# Patient Record
Sex: FEMALE | Race: WHITE | NOT HISPANIC OR LATINO | Employment: UNEMPLOYED | URBAN - METROPOLITAN AREA
[De-identification: names, ages, dates, MRNs, and addresses within clinical notes are randomized per-mention and may not be internally consistent; named-entity substitution may affect disease eponyms.]

---

## 2023-01-20 ENCOUNTER — PATIENT MESSAGE (OUTPATIENT)
Dept: OTHER | Facility: OTHER | Age: 28
End: 2023-01-20

## 2023-05-05 DIAGNOSIS — Z00.00 ROUTINE ADULT HEALTH MAINTENANCE: Primary | ICD-10-CM

## 2023-05-05 DIAGNOSIS — Z76.89 ENCOUNTER TO ESTABLISH CARE WITH NEW DOCTOR: ICD-10-CM

## 2023-05-08 ENCOUNTER — OFFICE VISIT (OUTPATIENT)
Dept: CARDIOLOGY | Facility: CLINIC | Age: 28
End: 2023-05-08
Payer: COMMERCIAL

## 2023-05-08 ENCOUNTER — HOSPITAL ENCOUNTER (OUTPATIENT)
Dept: CARDIOLOGY | Facility: HOSPITAL | Age: 28
Discharge: HOME | End: 2023-05-08
Attending: INTERNAL MEDICINE
Payer: COMMERCIAL

## 2023-05-08 VITALS
WEIGHT: 145 LBS | HEART RATE: 127 BPM | DIASTOLIC BLOOD PRESSURE: 70 MMHG | OXYGEN SATURATION: 99 % | WEIGHT: 144.38 LBS | HEIGHT: 63 IN | SYSTOLIC BLOOD PRESSURE: 120 MMHG | BODY MASS INDEX: 25.58 KG/M2 | BODY MASS INDEX: 25.69 KG/M2

## 2023-05-08 DIAGNOSIS — Z3A.35 35 WEEKS GESTATION OF PREGNANCY: Primary | ICD-10-CM

## 2023-05-08 DIAGNOSIS — Z00.00 ROUTINE ADULT HEALTH MAINTENANCE: ICD-10-CM

## 2023-05-08 DIAGNOSIS — Z76.89 ENCOUNTER TO ESTABLISH CARE WITH NEW DOCTOR: ICD-10-CM

## 2023-05-08 DIAGNOSIS — R00.0 SINUS TACHYCARDIA: ICD-10-CM

## 2023-05-08 PROCEDURE — 1159F MED LIST DOCD IN RCRD: CPT | Mod: CPTII,S$GLB,, | Performed by: INTERNAL MEDICINE

## 2023-05-08 PROCEDURE — 3008F BODY MASS INDEX DOCD: CPT | Mod: CPTII,S$GLB,, | Performed by: INTERNAL MEDICINE

## 2023-05-08 PROCEDURE — 3008F PR BODY MASS INDEX (BMI) DOCUMENTED: ICD-10-PCS | Mod: CPTII,S$GLB,, | Performed by: INTERNAL MEDICINE

## 2023-05-08 PROCEDURE — 99999 PR PBB SHADOW E&M-EST. PATIENT-LVL III: CPT | Mod: PBBFAC,,, | Performed by: INTERNAL MEDICINE

## 2023-05-08 PROCEDURE — 3078F DIAST BP <80 MM HG: CPT | Mod: CPTII,S$GLB,, | Performed by: INTERNAL MEDICINE

## 2023-05-08 PROCEDURE — 3078F PR MOST RECENT DIASTOLIC BLOOD PRESSURE < 80 MM HG: ICD-10-PCS | Mod: CPTII,S$GLB,, | Performed by: INTERNAL MEDICINE

## 2023-05-08 PROCEDURE — 99204 PR OFFICE/OUTPT VISIT, NEW, LEVL IV, 45-59 MIN: ICD-10-PCS | Mod: S$GLB,,, | Performed by: INTERNAL MEDICINE

## 2023-05-08 PROCEDURE — 93005 ELECTROCARDIOGRAM TRACING: CPT

## 2023-05-08 PROCEDURE — 1159F PR MEDICATION LIST DOCUMENTED IN MEDICAL RECORD: ICD-10-PCS | Mod: CPTII,S$GLB,, | Performed by: INTERNAL MEDICINE

## 2023-05-08 PROCEDURE — 93010 EKG 12-LEAD: ICD-10-PCS | Mod: ,,, | Performed by: INTERNAL MEDICINE

## 2023-05-08 PROCEDURE — 99999 PR PBB SHADOW E&M-EST. PATIENT-LVL III: ICD-10-PCS | Mod: PBBFAC,,, | Performed by: INTERNAL MEDICINE

## 2023-05-08 PROCEDURE — 99204 OFFICE O/P NEW MOD 45 MIN: CPT | Mod: S$GLB,,, | Performed by: INTERNAL MEDICINE

## 2023-05-08 PROCEDURE — 3074F SYST BP LT 130 MM HG: CPT | Mod: CPTII,S$GLB,, | Performed by: INTERNAL MEDICINE

## 2023-05-08 PROCEDURE — 3074F PR MOST RECENT SYSTOLIC BLOOD PRESSURE < 130 MM HG: ICD-10-PCS | Mod: CPTII,S$GLB,, | Performed by: INTERNAL MEDICINE

## 2023-05-08 PROCEDURE — 93010 ELECTROCARDIOGRAM REPORT: CPT | Mod: ,,, | Performed by: INTERNAL MEDICINE

## 2023-05-08 NOTE — PROGRESS NOTES
Subjective:   Patient ID:  Antolin Mejia is a 27 y.o. female who presents for evaluation of No chief complaint on file.      HPI  27-year-old female G3 para 2.    Comes in for evaluation of tachycardia.    She states that she went last week to the emergency room at [facility] for contractions there was told not real yet.    Seem to have a heart rate in the 140s.  Sinus tachycardia.    Comes here for evaluation today.    She states that sometimes she feels her palpitations however she does not seem to be limited with that.    Her blood pressure is normal.    She does not have any swelling.    Her D-dimer was negative.    Urinalysis did not show protein.    She denies any excessive caffeine, soft drinks or any stimulants.    No syncope or presyncope or dizziness.      Past Medical History:   Diagnosis Date    Anemia in pregnancy     2/20- 11/34.    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    Lordosis     Dr. Soares    Mild intermittent asthma     as a child    Rheumatoid Arthritis     Dr. LÓPEZ    Scoliosis     Spinal stenosis     Supervision of normal pregnancy     call [facility] for delivery. JOAN by LMP c/w 7w sono    Unwanted fertility     sterility papers signed 4/27/23       Past Surgical History:   Procedure Laterality Date    burned sciatic nerve  2015    DILATION AND CURETTAGE OF UTERUS  2017    LAPAROSCOPY  01/14/2021    DX LSC w/drainage LO cyst. path- none. Findings- LO w/simple 2-3cm cyst w/clear fld, bilat nl tubes, nl uterus, nl appendix    tonsils and adenoids  2016    WISDOM TOOTH EXTRACTION         Social History     Tobacco Use    Smoking status: Never    Smokeless tobacco: Never   Substance Use Topics    Alcohol use: Yes    Drug use: Never       Family History   Problem Relation Age of Onset    Breast cancer Mother     Heart disease Other     Diabetes type II Other        Review of  Systems   Cardiovascular:  Negative for chest pain, dyspnea on exertion, palpitations and syncope.   Genitourinary: Negative.    Neurological: Negative.      Current Outpatient Medications on File Prior to Visit   Medication Sig    ferrous sulfate (IRON, FERROUS SULFATE,) 325 mg (65 mg iron) Tab tablet Take 1 tablet (325 mg total) by mouth daily with breakfast.    valACYclovir (VALTREX) 1000 MG tablet Take one tablet (1000mg) by mouth twice a day for 3 days    famotidine (PEPCID) 20 MG tablet Take 1 tablet (20 mg total) by mouth 2 (two) times daily. (Patient not taking: Reported on 5/8/2023)    ondansetron (ZOFRAN) 4 MG tablet Take 1 tablet (4 mg total) by mouth every 6 (six) hours as needed for Nausea. (Patient not taking: Reported on 5/8/2023)     No current facility-administered medications on file prior to visit.       Objective:   Objective:  Wt Readings from Last 3 Encounters:   05/08/23 65.8 kg (145 lb)   05/08/23 65.5 kg (144 lb 6.4 oz)   05/08/23 65.8 kg (145 lb)     Temp Readings from Last 3 Encounters:   No data found for Temp     BP Readings from Last 3 Encounters:   05/08/23 120/70   05/08/23 122/68   04/27/23 120/66     Pulse Readings from Last 3 Encounters:   05/08/23 (!) 127       Physical Exam  Vitals reviewed.   Constitutional:       Appearance: She is well-developed.   Neck:      Vascular: No carotid bruit.   Cardiovascular:      Rate and Rhythm: Regular rhythm. Tachycardia present.      Pulses: Intact distal pulses.      Heart sounds: Normal heart sounds. No murmur heard.  Pulmonary:      Breath sounds: Normal breath sounds.   Neurological:      Mental Status: She is oriented to person, place, and time.       No results found for: CHOL  No results found for: HDL  No results found for: LDLCALC  No results found for: TRIG  No results found for: CHOLHDL    Chemistry        Component Value Date/Time     03/30/2023 1001    K 3.8 03/30/2023 1001     03/30/2023 1001    CO2 26 03/30/2023 1001     BUN 7 03/30/2023 1001    CREATININE 0.4 (A) 03/30/2023 1001    GLU 71 03/30/2023 1001        Component Value Date/Time    CALCIUM 7.9 (A) 03/30/2023 1001          No results found for: TSH  No results found for: INR, PROTIME  Lab Results   Component Value Date    WBC 8.05 02/18/2023    HGB 11.8 (A) 02/18/2023    HCT 34.4 (A) 02/18/2023    MCV 90.5 02/18/2023     02/18/2023     BNP  @LABRCNTIP(BNP,BNPTRIAGEBLO)@  CrCl cannot be calculated (Patient's most recent lab result is older than the maximum 7 days allowed.).     Imaging:  ======  No results found for this or any previous visit.    No results found for this or any previous visit.    No results found for this or any previous visit.    No results found for this or any previous visit.    No results found for this or any previous visit.    No valid procedures specified.    Diagnostic Results:  ECG: Reviewed    The ASCVD Risk score (Chasidy DK, et al., 2019) failed to calculate for the following reasons:    The 2019 ASCVD risk score is only valid for ages 40 to 79    Assessment and Plan:   35 weeks gestation of pregnancy    Sinus tachycardia  -     Ambulatory referral/consult to Cardiology  -     Holter monitor - 48 hour; Future      Reassurance.    Hydration.    TSH normal.      Follow up in six-month

## 2023-05-12 ENCOUNTER — HOSPITAL ENCOUNTER (OUTPATIENT)
Dept: CARDIOLOGY | Facility: HOSPITAL | Age: 28
Discharge: HOME | End: 2023-05-12
Attending: INTERNAL MEDICINE
Payer: COMMERCIAL

## 2023-05-12 DIAGNOSIS — R00.0 SINUS TACHYCARDIA: ICD-10-CM

## 2023-05-12 PROCEDURE — 93227 HOLTER MONITOR - 48 HOUR (CUPID ONLY): ICD-10-PCS | Mod: ,,, | Performed by: INTERNAL MEDICINE

## 2023-05-12 PROCEDURE — 93225 XTRNL ECG REC<48 HRS REC: CPT

## 2023-05-12 PROCEDURE — 93227 XTRNL ECG REC<48 HR R&I: CPT | Mod: ,,, | Performed by: INTERNAL MEDICINE

## 2023-05-15 ENCOUNTER — PATIENT MESSAGE (OUTPATIENT)
Dept: CARDIOLOGY | Facility: CLINIC | Age: 28
End: 2023-05-15
Payer: COMMERCIAL

## 2023-05-16 ENCOUNTER — TELEPHONE (OUTPATIENT)
Dept: CARDIOLOGY | Facility: CLINIC | Age: 28
End: 2023-05-16
Payer: COMMERCIAL

## 2023-05-16 ENCOUNTER — PATIENT MESSAGE (OUTPATIENT)
Dept: CARDIOLOGY | Facility: CLINIC | Age: 28
End: 2023-05-16
Payer: COMMERCIAL

## 2023-05-16 DIAGNOSIS — I47.10 SVT (SUPRAVENTRICULAR TACHYCARDIA): Primary | ICD-10-CM

## 2023-05-16 LAB
OHS CV EVENT MONITOR DAY: 0
OHS CV HOLTER LENGTH DECIMAL HOURS: 48
OHS CV HOLTER LENGTH HOURS: 48
OHS CV HOLTER LENGTH MINUTES: 0
OHS CV HOLTER SINUS AVERAGE HR: 97
OHS CV HOLTER SINUS MAX HR: 160
OHS CV HOLTER SINUS MIN HR: 53

## 2023-05-16 NOTE — TELEPHONE ENCOUNTER
Contacted patient. Gave patient results from Holter monitor. Also informed patient of referral to EP. Patient understood and accepted results with no questions or concerns.     ----- Message from Ambrosio Cueto MD sent at 5/16/2023  2:27 PM CDT -----  Contact: Antolin  Her monitor showed some benign type of speeding of her heart rate.  Please refer her to EP to Dr. Nusrat llamas for SVT   Thank you  ----- Message -----  From: Jono Sánchez  Sent: 5/16/2023   1:13 PM CDT  To: Ambrosio Cueto MD    Can you interpret her results please?   ----- Message -----  From: Bridget Calvert  Sent: 5/16/2023  12:07 PM CDT  To: Nory Valente Staff    Patient is calling regarding Holter monitor, reports wanting to discuss next steps and results. Please give patient a call back at [phone]

## 2023-06-26 ENCOUNTER — PATIENT MESSAGE (OUTPATIENT)
Dept: CARDIOLOGY | Facility: CLINIC | Age: 28
End: 2023-06-26
Payer: COMMERCIAL

## 2023-06-30 ENCOUNTER — TELEPHONE (OUTPATIENT)
Dept: CARDIOLOGY | Facility: CLINIC | Age: 28
End: 2023-06-30
Payer: COMMERCIAL

## 2023-06-30 NOTE — TELEPHONE ENCOUNTER
I called the pt because she has no showed her appt. Spoke with pt she states she in the hospital and will not make her appt today. Rescheduled the appt for another day. taiwo

## 2023-07-14 ENCOUNTER — OFFICE VISIT (OUTPATIENT)
Dept: CARDIOLOGY | Facility: CLINIC | Age: 28
End: 2023-07-14
Payer: COMMERCIAL

## 2023-07-14 VITALS
HEART RATE: 107 BPM | BODY MASS INDEX: 22.03 KG/M2 | HEIGHT: 63 IN | SYSTOLIC BLOOD PRESSURE: 102 MMHG | WEIGHT: 124.31 LBS | OXYGEN SATURATION: 97 % | DIASTOLIC BLOOD PRESSURE: 70 MMHG

## 2023-07-14 DIAGNOSIS — R00.2 PALPITATIONS: ICD-10-CM

## 2023-07-14 DIAGNOSIS — I47.10 SVT (SUPRAVENTRICULAR TACHYCARDIA): ICD-10-CM

## 2023-07-14 DIAGNOSIS — R00.0 SINUS TACHYCARDIA: Primary | ICD-10-CM

## 2023-07-14 PROCEDURE — 3008F BODY MASS INDEX DOCD: CPT | Mod: CPTII,S$GLB,, | Performed by: INTERNAL MEDICINE

## 2023-07-14 PROCEDURE — 1160F PR REVIEW ALL MEDS BY PRESCRIBER/CLIN PHARMACIST DOCUMENTED: ICD-10-PCS | Mod: CPTII,S$GLB,, | Performed by: INTERNAL MEDICINE

## 2023-07-14 PROCEDURE — 3078F DIAST BP <80 MM HG: CPT | Mod: CPTII,S$GLB,, | Performed by: INTERNAL MEDICINE

## 2023-07-14 PROCEDURE — 1159F PR MEDICATION LIST DOCUMENTED IN MEDICAL RECORD: ICD-10-PCS | Mod: CPTII,S$GLB,, | Performed by: INTERNAL MEDICINE

## 2023-07-14 PROCEDURE — 99999 PR PBB SHADOW E&M-EST. PATIENT-LVL III: ICD-10-PCS | Mod: PBBFAC,,, | Performed by: INTERNAL MEDICINE

## 2023-07-14 PROCEDURE — 3074F PR MOST RECENT SYSTOLIC BLOOD PRESSURE < 130 MM HG: ICD-10-PCS | Mod: CPTII,S$GLB,, | Performed by: INTERNAL MEDICINE

## 2023-07-14 PROCEDURE — 99999 PR PBB SHADOW E&M-EST. PATIENT-LVL III: CPT | Mod: PBBFAC,,, | Performed by: INTERNAL MEDICINE

## 2023-07-14 PROCEDURE — 3008F PR BODY MASS INDEX (BMI) DOCUMENTED: ICD-10-PCS | Mod: CPTII,S$GLB,, | Performed by: INTERNAL MEDICINE

## 2023-07-14 PROCEDURE — 1159F MED LIST DOCD IN RCRD: CPT | Mod: CPTII,S$GLB,, | Performed by: INTERNAL MEDICINE

## 2023-07-14 PROCEDURE — 3074F SYST BP LT 130 MM HG: CPT | Mod: CPTII,S$GLB,, | Performed by: INTERNAL MEDICINE

## 2023-07-14 PROCEDURE — 99203 OFFICE O/P NEW LOW 30 MIN: CPT | Mod: S$GLB,,, | Performed by: INTERNAL MEDICINE

## 2023-07-14 PROCEDURE — 99203 PR OFFICE/OUTPT VISIT, NEW, LEVL III, 30-44 MIN: ICD-10-PCS | Mod: S$GLB,,, | Performed by: INTERNAL MEDICINE

## 2023-07-14 PROCEDURE — 1160F RVW MEDS BY RX/DR IN RCRD: CPT | Mod: CPTII,S$GLB,, | Performed by: INTERNAL MEDICINE

## 2023-07-14 PROCEDURE — 3078F PR MOST RECENT DIASTOLIC BLOOD PRESSURE < 80 MM HG: ICD-10-PCS | Mod: CPTII,S$GLB,, | Performed by: INTERNAL MEDICINE

## 2023-07-14 NOTE — PROGRESS NOTES
Subjective:   Patient ID:  Antolin Mejia is a 27 y.o. female     Chief complaint:SART    HPI  New patient to me. (07/29/2023 )  Referred by Dr Cueto for evaluation and management of SART   --   Background as gleaned from patient's records and today's interview :  27-year-old female G3 now para 3.  Date of last confinement 6/3/23  Comes in for evaluation of tachycardia.    She states that she went to the emergency room at [facility] for what turned out to be Burdick Guzman contractions .    While there she seemed to have a heart rate in the 140s.  Sinus tachycardia.     She states that sometimes she feels her palpitations however she does not seem to be limited with that.    Her blood pressure is normal.    She does not have any swelling.    Her D-dimer was negative.    Urinalysis did not show protein.    She denies any excessive caffeine, soft drinks or any stimulants.    No syncope or presyncope or dizziness.     She had a Holter: on 5/12  >>  SVT  There were multiple runs of sustained SART. There were multiple runs of what appears to SART with rates varying between 120 and 145 bpm. These are often long runs that terminate abruptly into a short pause (1 sec or os) with one or 2 Junctional escape beats followed by a resumption of SART (at times at the same rate, at other times at a different rate).   The longest this pattern seemed to last before resumption of NSR was @ 1.5 hours This one particular event had rates of 145 then 130. It was followed by a 4 hour ST at 110-120 bpm. This ST seemed to be automatic as opposed to reentrant as HRs gradually slowed down over the ensuing 1.5 hr prior to presumed bedtime.     Circadian  The circadian pattern of sinus rate variability followed a exaggerated curve with elevated HRs. The patient slept from 22:00 CDT until 10:00 CDT with a heart rate of 110 bpm during waking hours, 70 bpm during sleep, 140 bpm during early waking hours.     SDNN is 137. If accurate,  adjusted for age, this c/w moderate vagal tone. It is likely that this is contaminated by the presence of frequent SART and that the vagal tone would be in fact more prominent.     Note that sleep time was derived based on the circadian curve pattern.   ..     Circadian (2)  The circadian pattern of sinus rate variability followed a exaggerated curve with elevated HRs. The patient slept from 20:00 CDT until 08:30 CDT with a heart rate of 115 bpm during waking hours, 75 bpm during sleep.     Note that sleep time was derived based on the sircadian curve pattern.     She now feels oK - although understandably sleep deprived to some extent. She is nursing her baby.       Current Outpatient Medications   Medication Sig    cephALEXin (KEFLEX) 500 MG capsule Take 1 capsule (500 mg total) by mouth every 6 (six) hours. (Patient not taking: Reported on 7/14/2023)    dicloxacillin (DYNAPEN) 500 MG capsule Take 1 capsule (500 mg total) by mouth 4 (four) times daily.    famotidine (PEPCID) 20 MG tablet Take 1 tablet (20 mg total) by mouth 2 (two) times daily. (Patient not taking: Reported on 5/8/2023)    ferrous sulfate (IRON, FERROUS SULFATE,) 325 mg (65 mg iron) Tab tablet Take 1 tablet (325 mg total) by mouth daily with breakfast. (Patient not taking: Reported on 7/14/2023)    ondansetron (ZOFRAN) 4 MG tablet Take 1 tablet (4 mg total) by mouth every 6 (six) hours as needed for Nausea. (Patient not taking: Reported on 5/8/2023)    valACYclovir (VALTREX) 1000 MG tablet Take one tablet (1000mg) by mouth twice a day for 3 days (Patient not taking: Reported on 6/1/2023)     No current facility-administered medications for this visit.       Review of Systems     Constitutional: Reviewed  for decreased appetite, weight gain and weight loss.   HENT: Reviewed for nosebleeds.    Eyes:  Reviewed for blurred vision and visual disturbance.   Cardiovascular: Reviewed for chest pain, claudication, cyanosis,dyspnea on exertion, leg  swelling, orthopnea,paroxysmal nocturnal dyspnearregular heartbeats, palpitations, near-syncope, and syncope.   Respiratory: Reviewed for cough, shortness of breath, wheezing, sleep disturbances due to breathing and snoring, .    Endocrine: Reviewed for heat intolerance.   Hematologic/Lymphatic: Reviewed for easy bruisability/bleeding.   Skin: Reviewed for rash.   Musculoskeletal: Reviewed for muscle weakness and myalgias.   Gastrointestinal: Reviewed for abdominal pain, anorexia, melena, nausea and vomiting.   Genitourinary: Reviewed for menorrhagia, frequency, nocturia and incontinence.   Neurological: Reviewed for excessive daytime sleepiness, dizziness, vertigo, weakness, headaches, loss of balance and seizures,   Psychiatric/Behavioral:  Reviewed for insomnia, altered mental status, depression, anxiety and nervousness.       All symptoms reviewed above were negative except for none     Social History     Tobacco Use   Smoking Status Never   Smokeless Tobacco Never       reports current alcohol use.   Past Medical History:   Diagnosis Date    Anemia in pregnancy     2/20- 11/34.    Degenerative disc disease at L5-S1 level     Fibromyalgia     GBS carrier     +GBS, tx in L&D.    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    Lordosis     Dr. Soares    Mild intermittent asthma     as a child    Rheumatoid Arthritis     Dr. LÓPEZ    Scoliosis     Spinal stenosis     Supervision of normal pregnancy     call [facility] for delivery. JOAN by LMP c/w 7w sono    SVT (supraventricular tachycardia)     BMB rec telemetry monitoring on L&D and 24hr PP.holter 5/15/23 w/SVT. no new recs from cardiologist regarding meds or delivery. Appt w/ Electrophysiologist 6/30    Unwanted fertility     sterility papers signed 4/27/23     Family History   Problem Relation Age of Onset    Breast cancer Mother     Heart disease Other     Diabetes type II Other      Social History     Socioeconomic History     Marital status: Single   Tobacco Use    Smoking status: Never    Smokeless tobacco: Never   Substance and Sexual Activity    Alcohol use: Yes    Drug use: Never    Sexual activity: Yes     Partners: Male     Past Surgical History:   Procedure Laterality Date    burned sciatic nerve  2015    DILATION AND CURETTAGE OF UTERUS  2017    LAPAROSCOPY  01/14/2021    DX LSC w/drainage LO cyst. path- none. Findings- LO w/simple 2-3cm cyst w/clear fld, bilat nl tubes, nl uterus, nl appendix    tonsils and adenoids  2016    WISDOM TOOTH EXTRACTION         Objective:   Physical Exam  Vitals and nursing note reviewed.   Constitutional:       Appearance: She is well-developed.   HENT:      Head: Normocephalic and atraumatic.      Right Ear: External ear normal.      Left Ear: External ear normal.   Neck:      Thyroid: No thyromegaly.   Cardiovascular:      Rate and Rhythm: Normal rate and regular rhythm.      Pulses: Intact distal pulses.           Carotid pulses are 2+ on the right side and 2+ on the left side.       Radial pulses are 2+ on the right side and 2+ on the left side.        Dorsalis pedis pulses are 2+ on the right side and 2+ on the left side.        Posterior tibial pulses are 2+ on the right side and 2+ on the left side.      Heart sounds: Normal heart sounds. No midsystolic click and no opening snap. No murmur heard.     No friction rub. No gallop. No S3 or S4 sounds.   Pulmonary:      Effort: Pulmonary effort is normal.      Breath sounds: Normal breath sounds.   Abdominal:      General: There is no distension.      Palpations: Abdomen is soft.      Tenderness: There is no abdominal tenderness.   Musculoskeletal:      Cervical back: Normal range of motion and neck supple.      Right lower leg: No swelling.      Left lower leg: No swelling.      Right ankle: No swelling.      Left ankle: No swelling.   Skin:     General: Skin is warm.      Findings: No rash.      Nails: There is no clubbing.   Neurological:     "  Mental Status: She is alert and oriented to person, place, and time.      Cranial Nerves: No cranial nerve deficit.      Gait: Gait normal.   Psychiatric:         Speech: Speech normal.         Behavior: Behavior normal.         Thought Content: Thought content normal.       /70 (BP Location: Right arm, Patient Position: Sitting)   Pulse 107   Ht 5' 3" (1.6 m)   Wt 56.4 kg (124 lb 5.4 oz)   LMP 09/02/2022   SpO2 97%   BMI 22.03 kg/m²          No results found for this or any previous visit.    WBC   Date Value Ref Range Status   02/18/2023 8.05 4.3 - 10.3 10E3/ul Final     Hematocrit   Date Value Ref Range Status   02/18/2023 34.4 (A) 37.0 - 47.0 % Final     Hemoglobin   Date Value Ref Range Status   02/18/2023 11.8 (A) 12.0 - 16.0 g/dL Final     Lab Results   Component Value Date     02/18/2023     Lab Results   Component Value Date    CREATININE 0.4 (A) 03/30/2023    EGFRNORACEVR > 60 03/30/2023    K 3.8 03/30/2023     No results found for: "BNP"      Assessment:    SART documented in the 3rd the trimester of pregnancy.  This is benign.  It may be related to pregnancy and may not reoccur when she goes back to her normal schedule.  1. Sino-atrial tachycardia    2. Palpitations        Plan:    Repeat Holter in a few months when sleep deprivation due to baby's needs is less.  No orders of the defined types were placed in this encounter.    Follow up if symptoms worsen or fail to improve.  There are no discontinued medications.  Outpatient Encounter Medications as of 7/14/2023   Medication Sig Dispense Refill    cephALEXin (KEFLEX) 500 MG capsule Take 1 capsule (500 mg total) by mouth every 6 (six) hours. (Patient not taking: Reported on 7/14/2023) 28 capsule 0    famotidine (PEPCID) 20 MG tablet Take 1 tablet (20 mg total) by mouth 2 (two) times daily. (Patient not taking: Reported on 5/8/2023) 60 tablet 11    ferrous sulfate (IRON, FERROUS SULFATE,) 325 mg (65 mg iron) Tab tablet Take 1 tablet " (325 mg total) by mouth daily with breakfast. (Patient not taking: Reported on 7/14/2023) 30 tablet 6    ondansetron (ZOFRAN) 4 MG tablet Take 1 tablet (4 mg total) by mouth every 6 (six) hours as needed for Nausea. (Patient not taking: Reported on 5/8/2023) 60 tablet 2    valACYclovir (VALTREX) 1000 MG tablet Take one tablet (1000mg) by mouth twice a day for 3 days (Patient not taking: Reported on 6/1/2023) 30 tablet 3     No facility-administered encounter medications on file as of 7/14/2023.     Medication List with Changes/Refills   New Medications    DICLOXACILLIN (DYNAPEN) 500 MG CAPSULE    Take 1 capsule (500 mg total) by mouth 4 (four) times daily.   Current Medications    CEPHALEXIN (KEFLEX) 500 MG CAPSULE    Take 1 capsule (500 mg total) by mouth every 6 (six) hours.    FAMOTIDINE (PEPCID) 20 MG TABLET    Take 1 tablet (20 mg total) by mouth 2 (two) times daily.    FERROUS SULFATE (IRON, FERROUS SULFATE,) 325 MG (65 MG IRON) TAB TABLET    Take 1 tablet (325 mg total) by mouth daily with breakfast.    ONDANSETRON (ZOFRAN) 4 MG TABLET    Take 1 tablet (4 mg total) by mouth every 6 (six) hours as needed for Nausea.    VALACYCLOVIR (VALTREX) 1000 MG TABLET    Take one tablet (1000mg) by mouth twice a day for 3 days        This note is at least partially dictated using the M*Modal Fluency Direct word recognition program. There are word recognition mistakes that are occasionally missed on review.

## 2023-07-29 PROBLEM — R00.2 PALPITATIONS: Status: ACTIVE | Noted: 2023-07-29

## 2023-07-29 PROBLEM — R00.0 SINUS TACHYCARDIA: Status: ACTIVE | Noted: 2023-07-29

## 2023-12-11 ENCOUNTER — OFFICE VISIT (OUTPATIENT)
Dept: CARDIOLOGY | Facility: CLINIC | Age: 28
End: 2023-12-11
Payer: COMMERCIAL

## 2023-12-11 VITALS
SYSTOLIC BLOOD PRESSURE: 112 MMHG | DIASTOLIC BLOOD PRESSURE: 80 MMHG | BODY MASS INDEX: 20.2 KG/M2 | OXYGEN SATURATION: 99 % | WEIGHT: 114 LBS | HEART RATE: 111 BPM | HEIGHT: 63 IN

## 2023-12-11 DIAGNOSIS — R00.2 PALPITATIONS: ICD-10-CM

## 2023-12-11 DIAGNOSIS — I47.10 SVT (SUPRAVENTRICULAR TACHYCARDIA): Primary | ICD-10-CM

## 2023-12-11 DIAGNOSIS — R00.0 SINUS TACHYCARDIA: ICD-10-CM

## 2023-12-11 PROCEDURE — 3008F BODY MASS INDEX DOCD: CPT | Mod: CPTII,S$GLB,, | Performed by: INTERNAL MEDICINE

## 2023-12-11 PROCEDURE — 3008F PR BODY MASS INDEX (BMI) DOCUMENTED: ICD-10-PCS | Mod: CPTII,S$GLB,, | Performed by: INTERNAL MEDICINE

## 2023-12-11 PROCEDURE — 1159F MED LIST DOCD IN RCRD: CPT | Mod: CPTII,S$GLB,, | Performed by: INTERNAL MEDICINE

## 2023-12-11 PROCEDURE — 3074F PR MOST RECENT SYSTOLIC BLOOD PRESSURE < 130 MM HG: ICD-10-PCS | Mod: CPTII,S$GLB,, | Performed by: INTERNAL MEDICINE

## 2023-12-11 PROCEDURE — 3074F SYST BP LT 130 MM HG: CPT | Mod: CPTII,S$GLB,, | Performed by: INTERNAL MEDICINE

## 2023-12-11 PROCEDURE — 99214 PR OFFICE/OUTPT VISIT, EST, LEVL IV, 30-39 MIN: ICD-10-PCS | Mod: S$GLB,,, | Performed by: INTERNAL MEDICINE

## 2023-12-11 PROCEDURE — 99999 PR PBB SHADOW E&M-EST. PATIENT-LVL III: CPT | Mod: PBBFAC,,, | Performed by: INTERNAL MEDICINE

## 2023-12-11 PROCEDURE — 3079F DIAST BP 80-89 MM HG: CPT | Mod: CPTII,S$GLB,, | Performed by: INTERNAL MEDICINE

## 2023-12-11 PROCEDURE — 1159F PR MEDICATION LIST DOCUMENTED IN MEDICAL RECORD: ICD-10-PCS | Mod: CPTII,S$GLB,, | Performed by: INTERNAL MEDICINE

## 2023-12-11 PROCEDURE — 99999 PR PBB SHADOW E&M-EST. PATIENT-LVL III: ICD-10-PCS | Mod: PBBFAC,,, | Performed by: INTERNAL MEDICINE

## 2023-12-11 PROCEDURE — 99214 OFFICE O/P EST MOD 30 MIN: CPT | Mod: S$GLB,,, | Performed by: INTERNAL MEDICINE

## 2023-12-11 PROCEDURE — 3079F PR MOST RECENT DIASTOLIC BLOOD PRESSURE 80-89 MM HG: ICD-10-PCS | Mod: CPTII,S$GLB,, | Performed by: INTERNAL MEDICINE

## 2023-12-11 RX ORDER — METOPROLOL SUCCINATE 25 MG/1
25 TABLET, EXTENDED RELEASE ORAL DAILY
Qty: 30 TABLET | Refills: 11 | Status: SHIPPED | OUTPATIENT
Start: 2023-12-11 | End: 2024-12-10

## 2023-12-11 NOTE — PROGRESS NOTES
Subjective:   Patient ID:  Antolin Mejia is a 28 y.o. female who presents for evaluation of Palpitations (Happens randomly even when she is relax and sitting down) and Shortness of Breath    12.11.2023    She saw electrophysiology after her last visit.  She had a Holter monitor that showed sinoatrial entered tachycardia.    .  At that time felt to be secondary to lack of proper sleep.  She states now she sleeps fine.  And she has a same symptoms.  She states also that she had her palpitations even before pregnancy.    Feels quickly tired with activity due to fact she feels her heart rate racing.  Palpitations   Associated symptoms include shortness of breath. Pertinent negatives include no chest pain or syncope.   Shortness of Breath  Pertinent negatives include no chest pain or syncope.     5.2023  27-year-old female G3 para 2.    Comes in for evaluation of tachycardia.    She states that she went last week to the emergency room at [facility] for contractions there was told not real yet.    Seem to have a heart rate in the 140s.  Sinus tachycardia.    Comes here for evaluation today.    She states that sometimes she feels her palpitations however she does not seem to be limited with that.    Her blood pressure is normal.    She does not have any swelling.    Her D-dimer was negative.    Urinalysis did not show protein.    She denies any excessive caffeine, soft drinks or any stimulants.    No syncope or presyncope or dizziness.      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    Lordosis     Dr. Soares    Mild intermittent asthma     as a child    Rheumatoid Arthritis     Dr. LÓPEZ    Scoliosis     Spinal stenosis     SVT (supraventricular tachycardia)     BMB rec telemetry monitoring on L&D and 24hr PP.holter 5/15/23 w/SVT. no new recs from cardiologist regarding  meds or delivery. Appt w/ Electrophysiologist 6/30    Unwanted fertility     sterility papers signed 4/27/23       Past Surgical History:   Procedure Laterality Date    burned sciatic nerve  2015    DILATION AND CURETTAGE OF UTERUS  2017    LAPAROSCOPIC SALPINGECTOMY Bilateral 10/04/2023    LAPAROSCOPY  01/14/2021    DX LSC w/drainage LO cyst. path- none. Findings- LO w/simple 2-3cm cyst w/clear fld, bilat nl tubes, nl uterus, nl appendix    tonsils and adenoids  2016    WISDOM TOOTH EXTRACTION         Social History     Tobacco Use    Smoking status: Never    Smokeless tobacco: Never   Substance Use Topics    Alcohol use: Yes    Drug use: Never       Family History   Problem Relation Age of Onset    Breast cancer Mother     Heart disease Other     Diabetes type II Other        Review of Systems   Cardiovascular:  Positive for palpitations. Negative for chest pain, dyspnea on exertion and syncope.   Respiratory:  Positive for shortness of breath.    Genitourinary: Negative.    Neurological: Negative.        Current Outpatient Medications on File Prior to Visit   Medication Sig    famotidine (PEPCID) 20 MG tablet Take 1 tablet (20 mg total) by mouth 2 (two) times daily. (Patient not taking: Reported on 5/8/2023)    ondansetron (ZOFRAN) 4 MG tablet Take 1 tablet (4 mg total) by mouth every 6 (six) hours as needed for Nausea. (Patient not taking: Reported on 5/8/2023)    valACYclovir (VALTREX) 1000 MG tablet Take one tablet (1000mg) by mouth twice a day for 3 days (Patient not taking: Reported on 6/1/2023)     No current facility-administered medications on file prior to visit.       Objective:   Objective:  Wt Readings from Last 3 Encounters:   12/11/23 51.7 kg (113 lb 15.7 oz)   10/18/23 53.1 kg (117 lb)   09/27/23 53.5 kg (118 lb)     Temp Readings from Last 3 Encounters:   No data found for Temp     BP Readings from Last 3 Encounters:   12/11/23 112/80   10/18/23 120/80   09/27/23 122/64     Pulse Readings from  "Last 3 Encounters:   12/11/23 (!) 111   07/14/23 107   05/08/23 (!) 127       Physical Exam  Vitals reviewed.   Constitutional:       Appearance: She is well-developed.   Neck:      Vascular: No carotid bruit.   Cardiovascular:      Rate and Rhythm: Regular rhythm. Tachycardia present.      Pulses: Intact distal pulses.      Heart sounds: Normal heart sounds. No murmur heard.  Pulmonary:      Breath sounds: Normal breath sounds.   Neurological:      Mental Status: She is oriented to person, place, and time.         No results found for: "CHOL"  No results found for: "HDL"  No results found for: "LDLCALC"  No results found for: "TRIG"  No results found for: "CHOLHDL"    Chemistry        Component Value Date/Time     03/30/2023 1001    K 3.8 03/30/2023 1001     03/30/2023 1001    CO2 26 03/30/2023 1001    BUN 7 03/30/2023 1001    CREATININE 0.4 (A) 03/30/2023 1001    GLU 71 03/30/2023 1001        Component Value Date/Time    CALCIUM 7.9 (A) 03/30/2023 1001          Lab Results   Component Value Date    TSH 0.651 02/12/2020     No results found for: "INR", "PROTIME"  Lab Results   Component Value Date    WBC 8.05 02/18/2023    HGB 11.8 (A) 02/18/2023    HCT 34.4 (A) 02/18/2023    MCV 90.5 02/18/2023     02/18/2023     BNP  @LABRCNTIP(BNP,BNPTRIAGEBLO)@  CrCl cannot be calculated (Patient's most recent lab result is older than the maximum 7 days allowed.).     Imaging:  ======  No results found for this or any previous visit.    No results found for this or any previous visit.    No results found for this or any previous visit.    No results found for this or any previous visit.    No results found for this or any previous visit.    No valid procedures specified.    Diagnostic Results:  ECG: Reviewed    The ASCVD Risk score (Chasidy DK, et al., 2019) failed to calculate for the following reasons:    The 2019 ASCVD risk score is only valid for ages 40 to 79    Assessment and Plan:   SVT " (supraventricular tachycardia)  -     Holter monitor - 48 hour; Future  -     metoprolol succinate (TOPROL-XL) 25 MG 24 hr tablet; Take 1 tablet (25 mg total) by mouth once daily.  Dispense: 30 tablet; Refill: 11    Sino-atrial tachycardia    Palpitations          We will start her on low-dose beta-blocker and obtain a Repeat Holter monitor.   And assess response.      Follow up in six-month

## 2023-12-20 ENCOUNTER — HOSPITAL ENCOUNTER (OUTPATIENT)
Dept: CARDIOLOGY | Facility: HOSPITAL | Age: 28
Discharge: HOME | End: 2023-12-20
Attending: INTERNAL MEDICINE
Payer: COMMERCIAL

## 2023-12-20 DIAGNOSIS — I47.10 SVT (SUPRAVENTRICULAR TACHYCARDIA): ICD-10-CM

## 2023-12-20 PROCEDURE — 93227 XTRNL ECG REC<48 HR R&I: CPT | Mod: ,,, | Performed by: INTERNAL MEDICINE

## 2023-12-20 PROCEDURE — 93227 HOLTER MONITOR - 48 HOUR (CUPID ONLY): ICD-10-PCS | Mod: ,,, | Performed by: INTERNAL MEDICINE

## 2023-12-20 PROCEDURE — 93226 XTRNL ECG REC<48 HR SCAN A/R: CPT

## 2023-12-23 LAB
OHS CV EVENT MONITOR DAY: 0
OHS CV HOLTER LENGTH DECIMAL HOURS: 46.93
OHS CV HOLTER LENGTH HOURS: 46
OHS CV HOLTER LENGTH MINUTES: 56
OHS CV HOLTER SINUS AVERAGE HR: 81
OHS CV HOLTER SINUS MAX HR: 141
OHS CV HOLTER SINUS MIN HR: 47

## 2024-01-11 ENCOUNTER — PATIENT MESSAGE (OUTPATIENT)
Dept: CARDIOLOGY | Facility: CLINIC | Age: 29
End: 2024-01-11
Payer: COMMERCIAL

## 2024-01-11 DIAGNOSIS — R00.2 PALPITATIONS: ICD-10-CM

## 2024-01-11 DIAGNOSIS — I47.10 SVT (SUPRAVENTRICULAR TACHYCARDIA): Primary | ICD-10-CM

## 2024-01-11 DIAGNOSIS — R00.0 SINUS TACHYCARDIA: ICD-10-CM

## 2024-01-19 ENCOUNTER — HOSPITAL ENCOUNTER (OUTPATIENT)
Dept: CARDIOLOGY | Facility: HOSPITAL | Age: 29
Discharge: HOME | End: 2024-01-19
Attending: INTERNAL MEDICINE
Payer: COMMERCIAL

## 2024-01-19 ENCOUNTER — OFFICE VISIT (OUTPATIENT)
Dept: CARDIOLOGY | Facility: CLINIC | Age: 29
End: 2024-01-19
Payer: COMMERCIAL

## 2024-01-19 VITALS
BODY MASS INDEX: 19.8 KG/M2 | HEIGHT: 63 IN | BODY MASS INDEX: 19.67 KG/M2 | HEART RATE: 130 BPM | SYSTOLIC BLOOD PRESSURE: 109 MMHG | DIASTOLIC BLOOD PRESSURE: 80 MMHG | OXYGEN SATURATION: 99 % | WEIGHT: 111 LBS | WEIGHT: 111.75 LBS

## 2024-01-19 DIAGNOSIS — R00.2 PALPITATIONS: ICD-10-CM

## 2024-01-19 DIAGNOSIS — R00.0 SINUS TACHYCARDIA: ICD-10-CM

## 2024-01-19 DIAGNOSIS — I47.10 SVT (SUPRAVENTRICULAR TACHYCARDIA): ICD-10-CM

## 2024-01-19 DIAGNOSIS — R00.2 PALPITATIONS: Primary | ICD-10-CM

## 2024-01-19 PROCEDURE — 1159F MED LIST DOCD IN RCRD: CPT | Mod: CPTII,S$GLB,, | Performed by: INTERNAL MEDICINE

## 2024-01-19 PROCEDURE — 93005 ELECTROCARDIOGRAM TRACING: CPT

## 2024-01-19 PROCEDURE — 99215 OFFICE O/P EST HI 40 MIN: CPT | Mod: S$GLB,,, | Performed by: INTERNAL MEDICINE

## 2024-01-19 PROCEDURE — 3079F DIAST BP 80-89 MM HG: CPT | Mod: CPTII,S$GLB,, | Performed by: INTERNAL MEDICINE

## 2024-01-19 PROCEDURE — 93010 ELECTROCARDIOGRAM REPORT: CPT | Mod: ,,, | Performed by: INTERNAL MEDICINE

## 2024-01-19 PROCEDURE — 3008F BODY MASS INDEX DOCD: CPT | Mod: CPTII,S$GLB,, | Performed by: INTERNAL MEDICINE

## 2024-01-19 PROCEDURE — 99999 PR PBB SHADOW E&M-EST. PATIENT-LVL IV: CPT | Mod: PBBFAC,,, | Performed by: INTERNAL MEDICINE

## 2024-01-19 PROCEDURE — 3074F SYST BP LT 130 MM HG: CPT | Mod: CPTII,S$GLB,, | Performed by: INTERNAL MEDICINE

## 2024-01-19 RX ORDER — FLECAINIDE ACETATE 50 MG/1
50 TABLET ORAL 2 TIMES DAILY
Qty: 60 TABLET | Refills: 11 | Status: SHIPPED | OUTPATIENT
Start: 2024-01-19 | End: 2024-02-05

## 2024-01-19 NOTE — PROGRESS NOTES
Subjective:   Patient ID:  Antolin Mejia is a 28 y.o. female     Chief complaint:  SART    HPI  New patient to me. (07/29/2023 )  Referred by Dr Cueto for evaluation and management of SART   --   Background as gleaned from patient's records and today's interview :  27-year-old female G3 now para 3.  Date of last confinement 6/3/23  Comes in for evaluation of tachycardia.    She states that she went to the emergency room at [facility] for what turned out to be Inavale Guzman contractions .    While there she seemed to have a heart rate in the 140s.  Sinus tachycardia.     She states that sometimes she feels her palpitations however she does not seem to be limited with that.    Her blood pressure is normal.    She does not have any swelling.    Her D-dimer was negative.    Urinalysis did not show protein.    She denies any excessive caffeine, soft drinks or any stimulants.    No syncope or presyncope or dizziness.      She had a Holter: on 5/12  >>  SVT  There were multiple runs of sustained SART. There were multiple runs of what appears to SART with rates varying between 120 and 145 bpm. These are often long runs that terminate abruptly into a short pause (1 sec or os) with one or 2 Junctional escape beats followed by a resumption of SART (at times at the same rate, at other times at a different rate).   The longest this pattern seemed to last before resumption of NSR was @ 1.5 hours This one particular event had rates of 145 then 130. It was followed by a 4 hour ST at 110-120 bpm. This ST seemed to be automatic as opposed to reentrant as HRs gradually slowed down over the ensuing 1.5 hr prior to presumed bedtime.      Circadian  The circadian pattern of sinus rate variability followed a exaggerated curve with elevated HRs. The patient slept from 22:00 CDT until 10:00 CDT with a heart rate of 110 bpm during waking hours, 70 bpm during sleep, 140 bpm during early waking hours.     SDNN is 137. If accurate,  adjusted for age, this c/w moderate vagal tone. It is likely that this is contaminated by the presence of frequent SART and that the vagal tone would be in fact more prominent.     Note that sleep time was derived based on the circadian curve pattern.   ..      Circadian (2)  The circadian pattern of sinus rate variability followed a exaggerated curve with elevated HRs. The patient slept from 20:00 CDT until 08:30 CDT with a heart rate of 115 bpm during waking hours, 75 bpm during sleep.     Note that sleep time was derived based on the sircadian curve pattern.      She now feels oK - although understandably sleep deprived to some extent. She is nursing her baby.       >>  SART documented in the 3rd the trimester of pregnancy.  This is benign.  It may be related to pregnancy and may not reoccur when she goes back to her normal schedule.   >>  Repeat Holter in a few months when sleep deprivation due to baby's needs is less.     Update 01/19/2024 :  Her baby is now 7-month-old.  She has another boy who is 4 years old.  She is always busy running around behind them.  Her  travels for work for 3 to 4 weeks at a time.  She continues to have easily induced palpitations and dyspnea.  She can go walk around block pushing her baby stroller while her other child is riding a bike.  By the time she comes back she is tired and dyspneic.  She was given Toprol by Dr. Encinas but this did not seem to alleviate her symptoms while causing her to feel tired and sleepy.  She stopped it.  A new Holter monitor was performed in late December.  This again shows a similar pattern of mild sinus tachycardia stopping abruptly with the relatively fast junctional beat recovery and then sinus rhythm.  Again, this is likely to be sinoatrial reentry tachycardia.  I have reviewed the actual image of the ECG tracing obtained today and it shows NSR alternating with sinus tachycardia with normal intervals.  Average HR is 92.      Current  Outpatient Medications   Medication Sig    famotidine (PEPCID) 20 MG tablet Take 1 tablet (20 mg total) by mouth 2 (two) times daily. (Patient not taking: Reported on 5/8/2023)    flecainide (TAMBOCOR) 50 MG Tab Take 1 tablet (50 mg total) by mouth 2 (two) times daily.    metoprolol succinate (TOPROL-XL) 25 MG 24 hr tablet Take 1 tablet (25 mg total) by mouth once daily. (Patient not taking: Reported on 1/19/2024)    ondansetron (ZOFRAN) 4 MG tablet Take 1 tablet (4 mg total) by mouth every 6 (six) hours as needed for Nausea. (Patient not taking: Reported on 5/8/2023)    valACYclovir (VALTREX) 1000 MG tablet Take one tablet (1000mg) by mouth twice a day for 3 days (Patient not taking: Reported on 6/1/2023)     No current facility-administered medications for this visit.     Review of Systems     Constitutional: Reviewed  for decreased appetite, weight gain and weight loss.   HENT: Reviewed for nosebleeds.    Eyes:  Reviewed for blurred vision and visual disturbance.   Cardiovascular: Reviewed for chest pain, claudication, cyanosis,dyspnea on exertion, leg swelling, orthopnea,paroxysmal nocturnal dyspnearregular heartbeats, palpitations, near-syncope, and syncope.   Respiratory: Reviewed for cough, shortness of breath, wheezing, sleep disturbances due to breathing and snoring, .    Endocrine: Reviewed for heat intolerance.   Hematologic/Lymphatic: Reviewed for easy bruisability/bleeding.   Skin: Reviewed for rash.   Musculoskeletal: Reviewed for muscle weakness and myalgias.   Gastrointestinal: Reviewed for abdominal pain, anorexia, melena, nausea and vomiting.   Genitourinary: Reviewed for menorrhagia, frequency, nocturia and incontinence.   Neurological: Reviewed for excessive daytime sleepiness, dizziness, vertigo, weakness, headaches, loss of balance and seizures,   Psychiatric/Behavioral:  Reviewed for insomnia, altered mental status, depression, anxiety and nervousness.       All symptoms reviewed above were  negative except for dyspnea on exertion, palpitations, constipation, occasional dizziness, some arthritic complaints.       Social History     Tobacco Use   Smoking Status Never   Smokeless Tobacco Never        Objective:     Physical Exam  Vitals and nursing note reviewed.   Constitutional:       Appearance: She is well-developed.   HENT:      Head: Normocephalic and atraumatic.      Right Ear: External ear normal.      Left Ear: External ear normal.   Neck:      Thyroid: No thyromegaly.   Cardiovascular:      Rate and Rhythm: Tachycardia present. Rhythm irregular.      Pulses: Intact distal pulses.           Carotid pulses are 2+ on the right side and 2+ on the left side.       Radial pulses are 2+ on the right side and 2+ on the left side.        Dorsalis pedis pulses are 2+ on the right side and 2+ on the left side.        Posterior tibial pulses are 2+ on the right side and 2+ on the left side.      Heart sounds: Normal heart sounds. No midsystolic click and no opening snap. No murmur heard.     No friction rub. No gallop. No S3 or S4 sounds.      Comments:  Orthostatic BP measurements  Sitting:                            BP  116/77          HR  118  Standing 1 min:                     111/84                 114  Standing 3 min:                     116/79                 110  Standing 5 min:                     109/80                 130  bpm      Pulmonary:      Effort: Pulmonary effort is normal.      Breath sounds: Normal breath sounds.   Abdominal:      General: There is no distension.      Palpations: Abdomen is soft.      Tenderness: There is no abdominal tenderness.   Musculoskeletal:      Cervical back: Normal range of motion and neck supple.      Right lower leg: No swelling.      Left lower leg: No swelling.      Right ankle: No swelling.      Left ankle: No swelling.   Skin:     General: Skin is warm.      Findings: No rash.      Nails: There is no clubbing.   Neurological:      Mental Status: She is  "alert and oriented to person, place, and time.      Cranial Nerves: No cranial nerve deficit.      Gait: Gait normal.   Psychiatric:         Speech: Speech normal.         Behavior: Behavior normal.         Thought Content: Thought content normal.       /80 (BP Location: Right arm, Patient Position: Standing, BP Method: Medium (Manual))   Pulse (!) 130   Ht 5' 3" (1.6 m)   Wt 50.3 kg (111 lb)   SpO2 99%   BMI 19.66 kg/m²       No results found for this or any previous visit.    WBC   Date Value Ref Range Status   02/18/2023 8.05 4.3 - 10.3 10E3/ul Final     Hematocrit   Date Value Ref Range Status   02/18/2023 34.4 (A) 37.0 - 47.0 % Final     Hemoglobin   Date Value Ref Range Status   02/18/2023 11.8 (A) 12.0 - 16.0 g/dL Final     Lab Results   Component Value Date     02/18/2023     Lab Results   Component Value Date    CREATININE 0.4 (A) 03/30/2023    EGFRNORACEVR > 60 03/30/2023    K 3.8 03/30/2023     No results found for: "BNP"         reports current alcohol use.  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    Lordosis     Dr. Soares    Mild intermittent asthma     as a child    Rheumatoid Arthritis     Dr. LÓPEZ    Scoliosis     Spinal stenosis     SVT (supraventricular tachycardia)     BMB rec telemetry monitoring on L&D and 24hr PP.holter 5/15/23 w/SVT. no new recs from cardiologist regarding meds or delivery. Appt w/ Electrophysiologist 6/30    Unwanted fertility     sterility papers signed 4/27/23     Past Surgical History:   Procedure Laterality Date    burned sciatic nerve  2015    DILATION AND CURETTAGE OF UTERUS  2017    LAPAROSCOPIC SALPINGECTOMY Bilateral 10/04/2023    LAPAROSCOPY  01/14/2021    DX LSC w/drainage LO cyst. path- none. Findings- LO w/simple 2-3cm cyst w/clear fld, bilat nl tubes, nl uterus, nl appendix    tonsils and adenoids  " 2016    WISDOM TOOTH EXTRACTION       Family History   Problem Relation Age of Onset    Breast cancer Mother     Heart disease Other     Diabetes type II Other        Assessment:   Somewhat symptomatic sinoatrial reentry tachycardia.  We will treat her with flecainide and see how this goes.  1. Palpitations    2. Sino-atrial tachycardia        Plan:        Orders Placed This Encounter   Procedures    FLECAINIDE LEVEL     Standing Status:   Future     Standing Expiration Date:   3/19/2025       Follow up in about 2 months (around 3/19/2024), or if symptoms worsen or fail to improve.    There are no discontinued medications.    Medications Ordered This Encounter   Medications    flecainide (TAMBOCOR) 50 MG Tab     Sig: Take 1 tablet (50 mg total) by mouth 2 (two) times daily.     Dispense:  60 tablet     Refill:  11       Medication List with Changes/Refills   New Medications    FLECAINIDE (TAMBOCOR) 50 MG TAB    Take 1 tablet (50 mg total) by mouth 2 (two) times daily.   Current Medications    FAMOTIDINE (PEPCID) 20 MG TABLET    Take 1 tablet (20 mg total) by mouth 2 (two) times daily.    METOPROLOL SUCCINATE (TOPROL-XL) 25 MG 24 HR TABLET    Take 1 tablet (25 mg total) by mouth once daily.    ONDANSETRON (ZOFRAN) 4 MG TABLET    Take 1 tablet (4 mg total) by mouth every 6 (six) hours as needed for Nausea.    VALACYCLOVIR (VALTREX) 1000 MG TABLET    Take one tablet (1000mg) by mouth twice a day for 3 days        This note is at least partially dictated using the M*Modal Fluency Direct word recognition program. There are word recognition mistakes that are occasionally missed on review.

## 2024-02-02 ENCOUNTER — LAB VISIT (OUTPATIENT)
Dept: LAB | Facility: HOSPITAL | Age: 29
End: 2024-02-02
Attending: INTERNAL MEDICINE
Payer: COMMERCIAL

## 2024-02-02 DIAGNOSIS — R00.0 SINUS TACHYCARDIA: ICD-10-CM

## 2024-02-02 PROCEDURE — 36415 COLL VENOUS BLD VENIPUNCTURE: CPT | Performed by: INTERNAL MEDICINE

## 2024-02-02 PROCEDURE — 80181 DRUG ASSAY FLECAINIDE: CPT | Performed by: INTERNAL MEDICINE

## 2024-02-05 ENCOUNTER — PATIENT MESSAGE (OUTPATIENT)
Dept: CARDIOLOGY | Facility: CLINIC | Age: 29
End: 2024-02-05
Payer: COMMERCIAL

## 2024-02-05 DIAGNOSIS — I47.11 INAPPROPRIATE SINUS NODE TACHYCARDIA: Primary | ICD-10-CM

## 2024-02-05 LAB — FLECAINIDE SERPL-MCNC: 0.1 MCG/ML

## 2024-02-05 RX ORDER — FLECAINIDE ACETATE 100 MG/1
100 TABLET ORAL 2 TIMES DAILY
Qty: 180 TABLET | Refills: 3 | Status: SHIPPED | OUTPATIENT
Start: 2024-02-05

## 2024-02-06 ENCOUNTER — TELEPHONE (OUTPATIENT)
Dept: CARDIOLOGY | Facility: CLINIC | Age: 29
End: 2024-02-06
Payer: COMMERCIAL

## 2024-02-06 ENCOUNTER — PATIENT MESSAGE (OUTPATIENT)
Dept: CARDIOLOGY | Facility: CLINIC | Age: 29
End: 2024-02-06
Payer: COMMERCIAL

## 2024-02-06 NOTE — TELEPHONE ENCOUNTER
Left a message to discuss the following pb      Nusrat-Cornelius Manzanares MD  You; Emma Aguirre, FNP-C15 hours ago (6:09 PM)       I do not like levels less than 0.2.  Thus, we will increase her dose to 100 bid and repeat the trough blood level in 10 days along with an ECG.

## 2024-02-16 ENCOUNTER — HOSPITAL ENCOUNTER (OUTPATIENT)
Dept: CARDIOLOGY | Facility: HOSPITAL | Age: 29
Discharge: HOME | End: 2024-02-16
Attending: INTERNAL MEDICINE
Payer: COMMERCIAL

## 2024-02-16 DIAGNOSIS — I47.11 INAPPROPRIATE SINUS NODE TACHYCARDIA: ICD-10-CM

## 2024-02-16 LAB
OHS QRS DURATION: 100 MS
OHS QTC CALCULATION: 427 MS

## 2024-02-16 PROCEDURE — 93005 ELECTROCARDIOGRAM TRACING: CPT

## 2024-02-16 PROCEDURE — 93010 ELECTROCARDIOGRAM REPORT: CPT | Mod: ,,, | Performed by: INTERNAL MEDICINE

## 2024-02-28 ENCOUNTER — TELEPHONE (OUTPATIENT)
Dept: CARDIOLOGY | Facility: CLINIC | Age: 29
End: 2024-02-28
Payer: COMMERCIAL

## 2024-02-28 NOTE — TELEPHONE ENCOUNTER
Left message ----- Message from Cornelius Gallego MD sent at 2/27/2024  8:42 PM CST -----  See comments below and call patient to discuss.   Please close encounter when done -- no need to route back to me.  Thanks  Flec level is 0.4  How is she doing with the med - Sx control?  Side effects?

## 2024-02-28 NOTE — TELEPHONE ENCOUNTER
----- Message from Cornelius Gallego MD sent at 2/27/2024  8:42 PM CST -----  See comments below and call patient to discuss.   Please close encounter when done -- no need to route back to me.  Thanks  Flec level is 0.4  How is she doing with the med - Sx control?  Side effects?

## 2024-02-29 ENCOUNTER — TELEPHONE (OUTPATIENT)
Dept: CARDIOLOGY | Facility: CLINIC | Age: 29
End: 2024-02-29
Payer: COMMERCIAL

## 2024-02-29 NOTE — TELEPHONE ENCOUNTER
Already addressed pb      ----- Message from Barbara Nichols sent at 2/28/2024  2:38 PM CST -----  Contact: Antolin Austin was returning the phone call regarding  her test results. Please call her back at [phone].    Thanks  TS

## 2024-02-29 NOTE — TELEPHONE ENCOUNTER
Already addressed pb    ----- Message from Leanna Seth LPN sent at 2/28/2024  3:29 PM CST -----  Contact: Antolin    ----- Message -----  From: Barbara Nichols  Sent: 2/28/2024   2:39 PM CST  To: Julián KEARNEY Staff    Pt was returning the phone call regarding  her test results. Please call her back at [phone].    Thanks  TS

## 2024-02-29 NOTE — TELEPHONE ENCOUNTER
Pt states that she is doing good on the medication not a jittery no shakey and no hot flashes lightheadness is much better pb        ----- Message from Cornelius Gallego MD sent at 2/27/2024  8:42 PM CST -----  See comments below and call patient to discuss.   Please close encounter when done -- no need to route back to me.  Thanks  Flec level is 0.4  How is she doing with the med - Sx control?  Side effects?

## 2024-04-04 ENCOUNTER — PATIENT MESSAGE (OUTPATIENT)
Dept: SURGERY | Facility: CLINIC | Age: 29
End: 2024-04-04
Payer: COMMERCIAL

## 2024-04-05 PROBLEM — Z91.89 AT HIGH RISK FOR BREAST CANCER: Status: ACTIVE | Noted: 2024-04-05

## 2024-04-05 PROBLEM — Z80.3 FAMILY HISTORY OF BREAST CANCER: Status: ACTIVE | Noted: 2024-04-05

## 2024-04-05 NOTE — PROGRESS NOTES
Breast Surgical Oncology  [place]  [facility]        PCP:  Cailin Primary Doctor  Date of Service: 2024    CHIEF COMPLAINT:   At high-risk for breast cancer    DIAGNOSIS:   Antolin Mejia is a 28 y.o. female who is kindly referred by Dr. Sydney Glez for evaluation of increased risk of breast cancer based on elevated score by a risk assessment model and family history of breast or ovarian cancer.     Her detailed family history of breast or ovarian cancer is as follows: mother with breast cancer at age 40, current age is 47. She underwent [employer] genetic testing and the results are pending.    Today, she denies breast concerns such as pain, masses, skin changes, nipple discharge, nipple retraction or lumps under the arm.     Her breast cancer risk factor profile is as follows: Menarche at 13, Menopause at N/A.  She is . Age at first live birth was 23. HRT: no and She did breastfeed with pregnancy.    Other breast cancer risk factors include mom with breast CA.     FAMILY HISTORY:     Family History   Problem Relation Age of Onset    Breast cancer Mother     Heart disease Other     Diabetes type II Other         PAST MEDICAL HISTORY:     Past Medical History:   Diagnosis Date    Degenerative disc disease at L5-S1 level     Family history of breast cancer     pt mother dx'd at 39yo. 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    Lordosis     Dr. Soares    Mild intermittent asthma     as a child    Rheumatoid Arthritis     Dr. LÓPEZ    Scoliosis     Spinal stenosis     SVT (supraventricular tachycardia)     BMB rec telemetry monitoring on L&D and 24hr PP.holter 5/15/23 w/SVT. no new recs from cardiologist regarding meds or delivery. Appt w/ Electrophysiologist        SURGICAL HISTORY:     Past Surgical History:   Procedure Laterality Date    burned sciatic nerve   2015    DILATION AND CURETTAGE OF UTERUS  2017    LAPAROSCOPIC SALPINGECTOMY Bilateral 10/04/2023    LAPAROSCOPY  01/14/2021    DX LSC w/drainage LO cyst. path- none. Findings- LO w/simple 2-3cm cyst w/clear fld, bilat nl tubes, nl uterus, nl appendix    tonsils and adenoids  2016    WISDOM TOOTH EXTRACTION         SOCIAL HISTORY:     Social History     Tobacco Use    Smoking status: Never    Smokeless tobacco: Never   Substance Use Topics    Alcohol use: Yes    Drug use: Never        MEDICATIONS/ALLERGIES:     Current Outpatient Medications   Medication Instructions    famotidine (PEPCID) 20 mg, Oral, 2 times daily    flecainide (TAMBOCOR) 100 mg, Oral, 2 times daily    metoprolol succinate (TOPROL-XL) 25 mg, Oral, Daily    ondansetron (ZOFRAN) 4 mg, Oral, Every 6 hours PRN    valACYclovir (VALTREX) 1000 MG tablet TAKE 1 TABLET(1000 MG) BY MOUTH TWICE DAILY FOR 3 DAYS     Review of patient's allergies indicates:  No Known Allergies    REVIEW OF SYSTEMS:   I have reviewed 12 systems, including 2 points per system. Pertinent positives reported are: constipation, joint pain/stiffness, back pain    PHYSICAL EXAM:   General: The patient appears well and is in no acute distress.     Brijesh Ledesma MA was present as a chaperone for the examination.   BREAST EXAM  No Asymmetry  Right:  - Mass: No  - Skin change: No  - Nipple Discharge: No  - Nipple retraction: No  - Axillary LAD: No  Left:   - Mass: No  - Skin change: No  - Nipple Discharge: No  - Nipple retraction: No  - Axillary LAD: No    IMAGING:   none    PATHOLOGY:   none    ASSESSMENT:     1. At high risk for breast cancer    2. Family history of breast cancer          PLAN:   Antolin Mejia is a 28 y.o. female who presents for evaluation in the high risk program.  She was identified for the program by her OBGYN due to having a elevated score by a risk assessment model and family history of breast or ovarian cancer.  Her lifetime risk for the development of breast  cancer by the Kelin Franklin v8 model is 27.8%.  Therefore, she meets criteria to be followed and screened as a high risk patient.      We reviewed the NCCN guidelines for high risk women to be the following:   Twice annual clinical breast exam  Screening mammogram beginning 10 years earlier than the youngest affected family member  Consideration of supplemental annual imaging alternating with her mammogram on the 6 month interval. We discussed that the guidelines currently include breast MRI as the supplemental imaging option.   Adopting risk-reduction strategies and   Consideration of chemoprevention.      Her individualized plan is the following:  She will see me each September for a clinical breast exam and see her OBGYN each April for her second annual clinical breast exam.    She will have her mammogram starting at age 30  She would like to proceed with breast MRI starting at age 32  Regarding risk reduction, she is recommended to maintain a healthy weight (BMI 18-25), regular aerobic exercise (at least 150 minutes/week), balanced healthy diet (high in vegetables, fruits, and whole grains) and avoidance of red meats, processed foods, & refined sugars. , and limit alcohol consumption .   We discussed the findings of the NSABP Prevention One trial and the potential side effects of tamoxifen. She is not interested in chemoprevention at this time. .    We will follow up on the results of her recent genetic testing    Antolin Mejia has a normal breast exam today. We discussed the possible signs and symptoms of breast cancer as lump, masses, new asymmetries, skin changes and nipple changes. She is encouraged to contact me if any new breast concerns arise.  She has been provided a handout that details today's discussion and her plan.     I spent a total of 45 minutes on this visit. This includes face to face time and non-face to face time preparing to see the patient (eg, review of tests), obtaining and/or reviewing  separately obtained history, documenting clinical information in the electronic or other health record, independently interpreting results and communicating results to the patient/family/caregiver, or care coordinator.      Tasha Cedillo MD

## 2024-04-08 ENCOUNTER — OFFICE VISIT (OUTPATIENT)
Dept: SURGERY | Facility: CLINIC | Age: 29
End: 2024-04-08
Payer: COMMERCIAL

## 2024-04-08 DIAGNOSIS — Z91.89 AT HIGH RISK FOR BREAST CANCER: Primary | ICD-10-CM

## 2024-04-08 DIAGNOSIS — Z80.3 FAMILY HISTORY OF BREAST CANCER: ICD-10-CM

## 2024-04-08 PROCEDURE — 99204 OFFICE O/P NEW MOD 45 MIN: CPT | Mod: S$GLB,,, | Performed by: SURGERY

## 2024-04-08 PROCEDURE — 99999 PR PBB SHADOW E&M-EST. PATIENT-LVL I: CPT | Mod: PBBFAC,,, | Performed by: SURGERY

## 2024-10-16 ENCOUNTER — PATIENT MESSAGE (OUTPATIENT)
Dept: SURGERY | Facility: CLINIC | Age: 29
End: 2024-10-16
Payer: COMMERCIAL

## 2025-05-01 ENCOUNTER — TELEPHONE (OUTPATIENT)
Dept: CARDIOLOGY | Facility: CLINIC | Age: 30
End: 2025-05-01
Payer: COMMERCIAL

## 2025-05-01 NOTE — TELEPHONE ENCOUNTER
Pt is calling to get a cardiology referral sent to Dr.David Alfonso since she has moved and is needing a cardiologist where she lives now.    Fax [phone]  ----- Message from Chelsie sent at 5/1/2025  4:18 PM CDT -----  Contact: self  Type:  Patient Returning CallWho Called:Antolin Vigil Left Message for Patient:Turner the patient know what this is regarding?:unsureWould the patient rather a call back or a response via [facility]? Call [facility] Call Back Number:[phone] Information: n/a

## 2025-05-01 NOTE — TELEPHONE ENCOUNTER
Attempted to contact pt back lm----- Message from Chelsie sent at 5/1/2025  2:45 PM CDT -----  Contact: self  Type:  Patient Requesting ReferralWho Called:Antolin Khoury the patient already have the specialty appointment scheduled?:noReferral to What Specialty:CardiologyReason for Referral:movedDoes the patient want the referral with a specific physician?:Ambrosio Fuller Jr, MD, Interventional CardiologyIs the specialist an [facility] or [facility]?:nonPatient Requesting a Response?:yesWould the patient rather a call back or a response via [facility]? Call [facility] Call Back Number:[phone] Information: fax [phone]